# Patient Record
Sex: FEMALE | NOT HISPANIC OR LATINO | URBAN - METROPOLITAN AREA
[De-identification: names, ages, dates, MRNs, and addresses within clinical notes are randomized per-mention and may not be internally consistent; named-entity substitution may affect disease eponyms.]

---

## 2019-12-09 ENCOUNTER — OFFICE VISIT (OUTPATIENT)
Dept: FAMILY MEDICINE | Facility: CLINIC | Age: 69
End: 2019-12-09
Payer: MEDICARE

## 2019-12-09 VITALS
BODY MASS INDEX: 30.76 KG/M2 | HEIGHT: 62 IN | WEIGHT: 167.13 LBS | TEMPERATURE: 98 F | DIASTOLIC BLOOD PRESSURE: 78 MMHG | SYSTOLIC BLOOD PRESSURE: 146 MMHG | HEART RATE: 71 BPM | OXYGEN SATURATION: 99 %

## 2019-12-09 DIAGNOSIS — S80.02XA CONTUSION OF LEFT KNEE, INITIAL ENCOUNTER: ICD-10-CM

## 2019-12-09 DIAGNOSIS — S70.01XA CONTUSION OF HIP AND THIGH, RIGHT, INITIAL ENCOUNTER: ICD-10-CM

## 2019-12-09 DIAGNOSIS — S70.12XA CONTUSION OF LEFT THIGH, INITIAL ENCOUNTER: ICD-10-CM

## 2019-12-09 DIAGNOSIS — S70.11XA CONTUSION OF HIP AND THIGH, RIGHT, INITIAL ENCOUNTER: ICD-10-CM
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
## 2019-12-09 PROCEDURE — 99214 OFFICE O/P EST MOD 30 MIN: CPT | Mod: S$GLB,,, | Performed by: FAMILY MEDICINE

## 2019-12-09 PROCEDURE — 1159F MED LIST DOCD IN RCRD: CPT | Mod: S$GLB,,, | Performed by: FAMILY MEDICINE

## 2019-12-09 PROCEDURE — 99214 PR OFFICE/OUTPT VISIT, EST, LEVL IV, 30-39 MIN: ICD-10-PCS | Mod: S$GLB,,, | Performed by: FAMILY MEDICINE

## 2019-12-09 PROCEDURE — 1159F PR MEDICATION LIST DOCUMENTED IN MEDICAL RECORD: ICD-10-PCS | Mod: S$GLB,,, | Performed by: FAMILY MEDICINE

## 2019-12-09 RX ORDER — DICLOFENAC SODIUM 50 MG/1
50 TABLET, DELAYED RELEASE ORAL 2 TIMES DAILY
Qty: 60 TABLET | Refills: 1 | Status: SHIPPED | OUTPATIENT
Start: 2019-12-09 | End: 2019-12-26

## 2019-12-09 RX ORDER — OXYCODONE HYDROCHLORIDE 5 MG/1
5 TABLET ORAL EVERY 6 HOURS PRN
Qty: 28 TABLET | Refills: 0 | Status: SHIPPED | OUTPATIENT
Start: 2019-12-09 | End: 2019-12-16

## 2019-12-09 RX ORDER — TIZANIDINE 2 MG/1
4 TABLET ORAL 2 TIMES DAILY
Qty: 80 TABLET | Refills: 1 | Status: SHIPPED | OUTPATIENT
Start: 2019-12-09 | End: 2019-12-19

## 2019-12-09 NOTE — PROGRESS NOTES
"Subjective:      Patient ID: Ansley Walker is a 69 y.o. female.    Chief Complaint: Follow-up (ER Visit)    70 yo female who fell in a man hole while walking down the street on [address] in [place] last Friday.  She went to the ER at [facility] and they took xrays but did not find any broken bones.  She was given percocet in ER for the pain and a script for Tramadol.  She reports that she has been with lightheadedness and itching since she started taking it. She complains of pain of left upper arm, right hip, left thigh and left knee causing her to limp because of the pain.  She did not have any symptoms prior to falling in the hole.    Review of Systems   Constitutional: Negative for fever.   HENT: Negative for ear pain, postnasal drip, rhinorrhea, sinus pain and sore throat.    Eyes: Negative for redness.   Respiratory: Negative for cough, chest tightness, shortness of breath and wheezing.    Cardiovascular: Negative for chest pain, palpitations and leg swelling.   Gastrointestinal: Negative for constipation, diarrhea, nausea and vomiting.   Genitourinary: Negative for difficulty urinating and dysuria.   Musculoskeletal: Negative for arthralgias.   Skin: Negative for rash.   Neurological: Negative for dizziness.        Objective:   BP (!) 146/78 (BP Location: Left arm, Patient Position: Sitting, BP Method: Large (Automatic))   Pulse 71   Temp 98.2 °F (36.8 °C)   Ht 5' 2" (1.575 m)   Wt 75.8 kg (167 lb 2 oz)   SpO2 99%   BMI 30.57 kg/m²    Estimated body mass index is 30.57 kg/m² as calculated from the following:    Height as of this encounter: 5' 2" (1.575 m).    Weight as of this encounter: 75.8 kg (167 lb 2 oz).   Physical Exam   Constitutional: She is oriented to person, place, and time. She appears well-developed and well-nourished.   HENT:   Head: Normocephalic and atraumatic.   Right Ear: Hearing and tympanic membrane normal.   Left Ear: Hearing and tympanic membrane normal.   Nose: Nose normal. "   Mouth/Throat: Uvula is midline, oropharynx is clear and moist and mucous membranes are normal.   Eyes: Pupils are equal, round, and reactive to light. Conjunctivae and EOM are normal.   Neck: Normal range of motion. Neck supple.   Cardiovascular: Normal rate, regular rhythm and normal heart sounds.   Pulmonary/Chest: Breath sounds normal. She has no wheezes. She has no rales.   Abdominal: Soft. Bowel sounds are normal. She exhibits no distension and no mass. There is no tenderness. There is no guarding.   Musculoskeletal: Normal range of motion. She exhibits no edema.        Right hip: She exhibits tenderness.        Left knee: She exhibits swelling, ecchymosis and erythema. Tenderness found. Medial joint line and lateral joint line tenderness noted.        Right upper arm: She exhibits tenderness and swelling.        Left upper leg: She exhibits tenderness and swelling.        Right lower leg: She exhibits tenderness and laceration (small laceration to the right anterior leg).        Legs:  Neurological: She is alert and oriented to person, place, and time. No cranial nerve deficit.   Skin: Skin is warm, dry and intact. Bruising (bruising to the left leg) noted. No rash noted. No erythema.   Psychiatric: She has a normal mood and affect. Her speech is normal and behavior is normal. Judgment and thought content normal. Cognition and memory are normal.   Nursing note and vitals reviewed.    No results found for: WBC, HGB, HCT, PLT, CHOL, TRIG, HDL, LDLDIRECT, ALT, AST, NA, K, CL, CREATININE, BUN, CO2, TSH, PSA, INR, GLUF, HGBA1C, MICROALBUR   Assessment:      Problem List Items Addressed This Visit        Orthopedic    Contusion of left knee    Relevant Medications    diclofenac (VOLTAREN) 50 MG EC tablet    tiZANidine (ZANAFLEX) 2 MG tablet    oxyCODONE (ROXICODONE) 5 MG immediate release tablet    Contusion of left thigh    Relevant Medications    diclofenac (VOLTAREN) 50 MG EC tablet    tiZANidine (ZANAFLEX) 2  MG tablet       Other    Fall (on)(from) sidewalk curb, initial encounter    Contusion of hip and thigh, right, initial encounter    Relevant Medications    diclofenac (VOLTAREN) 50 MG EC tablet    tiZANidine (ZANAFLEX) 2 MG tablet           Plan:   Obtain medical records from the ER at [facility].  Start diclofenac 50 mg po bid with tizanidine 2 mg po bid and percocet 5 mg to be taken prn pain.  She will likely need PT but I will wait until the follow ppt in 2-3 weeks.

## 2019-12-26 ENCOUNTER — OFFICE VISIT (OUTPATIENT)
Dept: FAMILY MEDICINE | Facility: CLINIC | Age: 69
End: 2019-12-26
Payer: MEDICARE

## 2019-12-26 VITALS
SYSTOLIC BLOOD PRESSURE: 197 MMHG | DIASTOLIC BLOOD PRESSURE: 91 MMHG | TEMPERATURE: 98 F | BODY MASS INDEX: 30.73 KG/M2 | HEIGHT: 62 IN | OXYGEN SATURATION: 98 % | HEART RATE: 61 BPM | WEIGHT: 167 LBS

## 2019-12-26 DIAGNOSIS — S70.12XD CONTUSION OF LEFT THIGH, SUBSEQUENT ENCOUNTER: ICD-10-CM

## 2019-12-26 DIAGNOSIS — S70.11XD CONTUSION OF HIP AND THIGH, RIGHT, SUBSEQUENT ENCOUNTER: ICD-10-CM

## 2019-12-26 DIAGNOSIS — S80.02XD CONTUSION OF LEFT KNEE, SUBSEQUENT ENCOUNTER: ICD-10-CM

## 2019-12-26 DIAGNOSIS — S39.012D STRAIN OF LUMBAR REGION, SUBSEQUENT ENCOUNTER: ICD-10-CM

## 2019-12-26 DIAGNOSIS — I10 ESSENTIAL HYPERTENSION: ICD-10-CM

## 2019-12-26 DIAGNOSIS — S13.9XXD SPRAIN OF CERVICAL NECK, SUBSEQUENT ENCOUNTER: ICD-10-CM

## 2019-12-26 DIAGNOSIS — S70.01XD CONTUSION OF HIP AND THIGH, RIGHT, SUBSEQUENT ENCOUNTER: ICD-10-CM

## 2019-12-26 DIAGNOSIS — R30.0 DYSURIA: ICD-10-CM
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
## 2019-12-26 PROBLEM — S39.012A STRAIN OF LUMBAR REGION: Status: ACTIVE | Noted: 2019-12-26

## 2019-12-26 PROCEDURE — 1159F MED LIST DOCD IN RCRD: CPT | Mod: S$GLB,,, | Performed by: FAMILY MEDICINE

## 2019-12-26 PROCEDURE — 99214 OFFICE O/P EST MOD 30 MIN: CPT | Mod: S$GLB,,, | Performed by: FAMILY MEDICINE

## 2019-12-26 PROCEDURE — 1159F PR MEDICATION LIST DOCUMENTED IN MEDICAL RECORD: ICD-10-PCS | Mod: S$GLB,,, | Performed by: FAMILY MEDICINE

## 2019-12-26 PROCEDURE — 99214 PR OFFICE/OUTPT VISIT, EST, LEVL IV, 30-39 MIN: ICD-10-PCS | Mod: S$GLB,,, | Performed by: FAMILY MEDICINE

## 2019-12-26 RX ORDER — DICLOFENAC SODIUM 10 MG/G
2 GEL TOPICAL 3 TIMES DAILY
Qty: 100 G | Refills: 5 | Status: SHIPPED | OUTPATIENT
Start: 2019-12-26

## 2019-12-26 RX ORDER — DICLOFENAC SODIUM 10 MG/G
2 GEL TOPICAL 3 TIMES DAILY
Qty: 100 G | Refills: 5 | Status: SHIPPED | OUTPATIENT
Start: 2019-12-26 | End: 2019-12-26

## 2019-12-26 RX ORDER — DICLOFENAC SODIUM 10 MG/G
2 GEL TOPICAL DAILY
Qty: 100 G | Refills: 2 | Status: SHIPPED | OUTPATIENT
Start: 2019-12-26 | End: 2019-12-26 | Stop reason: CLARIF

## 2019-12-26 RX ORDER — DICLOFENAC SODIUM 10 MG/G
2 GEL TOPICAL 4 TIMES DAILY
Qty: 100 G | Refills: 5 | Status: SHIPPED | OUTPATIENT
Start: 2019-12-26 | End: 2019-12-26 | Stop reason: CLARIF

## 2019-12-26 NOTE — PROGRESS NOTES
Subjective:      Patient ID: Ansley Woodard is a 69 y.o. female.    Chief Complaint: Follow-up and Fall    70 yo female in for follow up.  She states that she has not been able to tolerate the medication because of the side effects.  She states that she has been with pain to the lower back and the felt pain in the left kidney.  She also still has pain in her neck and shoulders.  She has pain in both hips and both wrists.  She has not been sleeping well. She has some pain and numbness in the right arm.  She hs irregular beats in the heart.  She states that her right hand had been feeling cold.  She states that she has been feeling some tightness in her chest. She states that all of her symptoms started after she fell in the hole.  Patient reports she still have pain in the neck and shoulders, both wrists, both hips, lower back and thigh muscles and both knees as well.  She reports that she she started taking medications that was prescribed she experience tightness, dizziness, blurry vision, increased heart rate, shortness of breath, insomnia, runny nose and watery eyes and confusion.  She stopped taking the medications that was prescribed last visit about 1 week ago.  She also complains of dysuria over the past week.    Review of Systems   Constitutional: Negative for fever.   HENT: Negative for ear pain, postnasal drip, rhinorrhea, sinus pain and sore throat.    Eyes: Negative for redness.   Respiratory: Negative for cough, chest tightness, shortness of breath and wheezing.    Cardiovascular: Negative for chest pain, palpitations and leg swelling.   Gastrointestinal: Positive for abdominal pain. Negative for constipation, diarrhea, nausea and vomiting.   Genitourinary: Positive for dysuria. Negative for difficulty urinating.   Musculoskeletal: Positive for arthralgias, back pain, myalgias, neck pain and neck stiffness.   Skin: Negative for rash.   Neurological: Positive for dizziness and light-headedness.  "    Medication List with Changes/Refills   Current Medications    DICLOFENAC (VOLTAREN) 50 MG EC TABLET    Take 1 tablet (50 mg total) by mouth 2 (two) times daily.      Objective:   BP (!) 197/91 (BP Location: Left arm, Patient Position: Sitting, BP Method: Medium (Automatic))   Pulse 61   Temp 97.6 °F (36.4 °C)   Ht 5' 2" (1.575 m)   Wt 75.8 kg (167 lb)   SpO2 98%   BMI 30.54 kg/m²    Estimated body mass index is 30.54 kg/m² as calculated from the following:    Height as of this encounter: 5' 2" (1.575 m).    Weight as of this encounter: 75.8 kg (167 lb).   Physical Exam   Constitutional: She is oriented to person, place, and time. She appears well-developed and well-nourished.   HENT:   Head: Normocephalic and atraumatic.   Right Ear: Hearing and tympanic membrane normal.   Left Ear: Hearing and tympanic membrane normal.   Nose: Nose normal.   Mouth/Throat: Uvula is midline, oropharynx is clear and moist and mucous membranes are normal.   Eyes: Pupils are equal, round, and reactive to light. Conjunctivae and EOM are normal.   Neck: Normal range of motion. Neck supple.   Cardiovascular: Normal rate, regular rhythm and normal heart sounds.   Pulmonary/Chest: Breath sounds normal. She has no wheezes. She has no rales.   Abdominal: Soft. Normal appearance and bowel sounds are normal. She exhibits no distension and no mass. There is tenderness in the right lower quadrant and suprapubic area. There is no guarding.       Tenderness to palpation of the right lower quadrant of the abdomen and suprapubic region.   Musculoskeletal: She exhibits no edema.        Right knee: Tenderness found. Medial joint line and lateral joint line tenderness noted.        Left knee: Tenderness found. Medial joint line and lateral joint line tenderness noted.        Cervical back: She exhibits tenderness, bony tenderness, pain and spasm. She exhibits normal range of motion.        Lumbar back: She exhibits tenderness, pain and spasm. "        Right upper arm: She exhibits tenderness.        Left upper arm: She exhibits tenderness.        Legs:  Tenderness to palpation of the left thigh with the tiny nodule present near the left knee.   Neurological: She is alert and oriented to person, place, and time. She has normal strength. No cranial nerve deficit. She displays a negative Romberg sign.   Skin: Skin is warm and dry. No rash noted. No erythema.   Psychiatric: She has a normal mood and affect. Her speech is normal and behavior is normal. Judgment and thought content normal. Cognition and memory are normal.   Nursing note and vitals reviewed.    No results found for: WBC, HGB, HCT, PLT, CHOL, TRIG, HDL, LDLDIRECT, ALT, AST, NA, K, CL, CREATININE, BUN, CO2, TSH, PSA, INR, GLUF, HGBA1C, MICROALBUR   Assessment:      Problem List Items Addressed This Visit        Cardiac/Vascular    Essential hypertension    Relevant Orders    Comprehensive metabolic panel    CBC auto differential    Lipid panel    TSH    Urinalysis, Reflex to Urine Culture Urine, Clean Catch       Renal/    Dysuria       Orthopedic    Contusion of left knee    Relevant Medications    diclofenac sodium (VOLTAREN) 1 % Gel    Contusion of left thigh    Relevant Medications    diclofenac sodium (VOLTAREN) 1 % Gel    Strain of lumbar region       Other    Fall (on)(from) sidewalk curb, initial encounter    Contusion of hip and thigh, right, initial encounter    Relevant Medications    diclofenac sodium (VOLTAREN) 1 % Gel    Sprain of cervical neck, subsequent encounter           Plan:   Patient has had multiple side effects to medications that was prescribed so I will give her a prescription for diclofenac gel to apply to affected areas of pain as directed.  I will also submit a referral to physical therapy for her on next week since her insurance is changing to a different one next week.  Because she had too many different reactions to medications prescribe she would like to not take  any more meds for the pain at this time..  Her blood pressure is high today so I am asking her to monitor her blood pressure regularly to see if it is than high so that we can initiate medication for hypertension.  I will obtain labs and a urinalysis to check for a UTI.

## 2019-12-27 LAB
ABS NRBC COUNT: 0 X 10 3/UL (ref 0–0.01)
ABSOLUTE BASOPHIL: 0.04 X 10 3/UL (ref 0–0.22)
ABSOLUTE EOSINOPHIL: 0.28 X 10 3/UL (ref 0.04–0.54)
ABSOLUTE IMMATURE GRAN: 0.02 X 10 3/UL (ref 0–0.04)
ABSOLUTE LYMPHOCYTE: 2 X 10 3/UL (ref 0.86–4.75)
ABSOLUTE MONOCYTE: 0.65 X 10 3/UL (ref 0.22–1.08)
ALBUMIN SERPL-MCNC: 4.5 G/DL (ref 3.5–5.2)
ALBUMIN/GLOB SERPL ELPH: 1.3 {RATIO} (ref 1–2.7)
ALP ISOS SERPL LEV INH-CCNC: 99 U/L (ref 35–105)
ALT (SGPT): 14 U/L (ref 0–33)
AMORPH URATE CRY URNS QL MICRO: NEGATIVE
ANION GAP SERPL CALC-SCNC: 13 MMOL/L (ref 8–17)
AST SERPL-CCNC: 18 U/L (ref 0–32)
BACTERIA #/AREA URNS HPF: ABNORMAL /[HPF]
BASOPHILS NFR BLD: 0.8 % (ref 0.2–1.2)
BILIRUB UR QL STRIP: NEGATIVE
BILIRUBIN, TOTAL: 0.43 MG/DL (ref 0–1.2)
BUN/CREAT SERPL: 15.4 (ref 6–20)
CALCIUM SERPL-MCNC: 9.6 MG/DL (ref 8.6–10.2)
CARBON DIOXIDE, CO2: 27 MMOL/L (ref 22–29)
CHLORIDE: 104 MMOL/L (ref 98–107)
CHOLEST SERPL-MSCNC: 226 MG/DL (ref 100–200)
CLARITY UR: CLEAR
COLOR UR: YELLOW
CREAT SERPL-MCNC: 0.78 MG/DL (ref 0.5–0.9)
EOSINOPHIL NFR BLD: 5.9 % (ref 0.7–7)
EPITHELIAL CELLS: ABNORMAL
GFR ESTIMATION: 73.01
GLOBULIN: 3.6 G/DL (ref 1.5–4.5)
GLUCOSE (UA): NEGATIVE MG/DL
GLUCOSE: 96 MG/DL (ref 82–115)
HCT VFR BLD AUTO: 42.4 % (ref 37–47)
HDLC SERPL-MCNC: 53 MG/DL
HGB BLD-MCNC: 13.1 G/DL (ref 12–16)
IMMATURE GRANULOCYTES: 0.4 % (ref 0–0.5)
KETONES UR QL STRIP: NEGATIVE MG/DL
LDL/HDL RATIO: 3 (ref 1–3)
LDLC SERPL CALC-MCNC: 159.2 MG/DL (ref 0–100)
LEUKOCYTE ESTERASE UR QL STRIP: ABNORMAL
LYMPHOCYTES NFR BLD: 42.5 % (ref 19.3–53.1)
MCH RBC QN AUTO: 29.7 PG (ref 27–32)
MCHC RBC AUTO-ENTMCNC: 30.9 G/DL (ref 32–36)
MCV RBC AUTO: 96.1 FL (ref 82–100)
MONOCYTES NFR BLD: 13.8 % (ref 4.7–12.5)
MUCOUS THREADS URNS QL MICRO: ABNORMAL
NEUTROPHILS ABSOLUTE COUNT: 1.72 X 10 3/UL (ref 2.15–7.56)
NEUTROPHILS NFR BLD: 36.6 %
NITRITE UR QL STRIP: NEGATIVE
NUCLEATED RED BLOOD CELLS: 0 /100 WBC (ref 0–0.2)
OCCULT BLOOD: ABNORMAL
PH, URINE: 5 (ref 5–7.5)
PLATELET # BLD AUTO: 222 X 10 3/UL (ref 135–400)
POTASSIUM: 4.4 MMOL/L (ref 3.5–5.1)
PROT SNV-MCNC: 8.1 G/DL (ref 6.4–8.3)
PROT UR QL STRIP: NEGATIVE MG/DL
RBC # BLD AUTO: 4.41 X 10 6/UL (ref 4.2–5.4)
RBC/HPF: ABNORMAL
RDW-SD: 47 FL (ref 37–54)
SODIUM: 144 MMOL/L (ref 136–145)
SP GR UR STRIP: 1.02 (ref 1–1.03)
TRIGL SERPL-MCNC: 69 MG/DL (ref 0–150)
TSH SERPL DL<=0.005 MIU/L-ACNC: 1.48 UIU/ML (ref 0.27–4.2)
UREA NITROGEN (BUN): 12 MG/DL (ref 8–23)
UROBILINOGEN, URINE: NORMAL E.U./DL (ref 0–1)
WBC # BLD: 4.71 X 10 3/UL (ref 4.3–10.8)
WBC/HPF: ABNORMAL

## 2019-12-31 ENCOUNTER — TELEPHONE (OUTPATIENT)
Dept: FAMILY MEDICINE | Facility: CLINIC | Age: 69
End: 2019-12-31

## 2019-12-31 DIAGNOSIS — S70.12XD CONTUSION OF LEFT THIGH, SUBSEQUENT ENCOUNTER: ICD-10-CM

## 2019-12-31 DIAGNOSIS — S70.01XD CONTUSION OF HIP AND THIGH, RIGHT, SUBSEQUENT ENCOUNTER: ICD-10-CM

## 2019-12-31 DIAGNOSIS — S80.02XD CONTUSION OF LEFT KNEE, SUBSEQUENT ENCOUNTER: ICD-10-CM

## 2019-12-31 DIAGNOSIS — S70.11XD CONTUSION OF HIP AND THIGH, RIGHT, SUBSEQUENT ENCOUNTER: ICD-10-CM

## 2019-12-31 NOTE — TELEPHONE ENCOUNTER
Patient states that you can send the rx that you guys discussed on 12/27/19 to [facility] on [address] here in [place], because they deliver.

## 2020-01-23 ENCOUNTER — OFFICE VISIT (OUTPATIENT)
Dept: FAMILY MEDICINE | Facility: CLINIC | Age: 70
End: 2020-01-23
Payer: MEDICARE

## 2020-01-23 VITALS
TEMPERATURE: 98 F | DIASTOLIC BLOOD PRESSURE: 76 MMHG | BODY MASS INDEX: 31.35 KG/M2 | OXYGEN SATURATION: 100 % | HEIGHT: 62 IN | HEART RATE: 67 BPM | WEIGHT: 170.38 LBS | SYSTOLIC BLOOD PRESSURE: 159 MMHG

## 2020-01-23 DIAGNOSIS — S13.4XXS SPRAIN OF LIGAMENTS OF CERVICAL SPINE, SEQUELA: ICD-10-CM

## 2020-01-23 DIAGNOSIS — S80.02XS CONTUSION OF LEFT KNEE, SEQUELA: ICD-10-CM

## 2020-01-23 DIAGNOSIS — S39.012S STRAIN OF LUMBAR REGION, SEQUELA: ICD-10-CM

## 2020-01-23 DIAGNOSIS — R07.9 CHEST PAIN, UNSPECIFIED TYPE: ICD-10-CM
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
## 2020-01-23 DIAGNOSIS — I10 ESSENTIAL HYPERTENSION: ICD-10-CM

## 2020-01-23 DIAGNOSIS — S70.12XS CONTUSION OF LEFT THIGH, SEQUELA: ICD-10-CM

## 2020-01-23 PROBLEM — S13.4XXA SPRAIN OF LIGAMENTS OF CERVICAL SPINE: Status: ACTIVE | Noted: 2019-12-26

## 2020-01-23 PROCEDURE — 1159F PR MEDICATION LIST DOCUMENTED IN MEDICAL RECORD: ICD-10-PCS | Mod: S$GLB,,, | Performed by: FAMILY MEDICINE

## 2020-01-23 PROCEDURE — 99214 PR OFFICE/OUTPT VISIT, EST, LEVL IV, 30-39 MIN: ICD-10-PCS | Mod: S$GLB,,, | Performed by: FAMILY MEDICINE

## 2020-01-23 PROCEDURE — 1159F MED LIST DOCD IN RCRD: CPT | Mod: S$GLB,,, | Performed by: FAMILY MEDICINE

## 2020-01-23 PROCEDURE — 99214 OFFICE O/P EST MOD 30 MIN: CPT | Mod: S$GLB,,, | Performed by: FAMILY MEDICINE

## 2020-01-23 NOTE — PROGRESS NOTES
"Subjective:      Patient ID: Ansley Hu is a 69 y.o. female.    Chief Complaint: Follow-up (Fall)    68 yo female in for follow up.  She fell and has been with a headache and neck pain and lower back pain. She tried the diclofenac gel but she states that it is causing side effects.  She would like to try a chiropractor for some therapy.  She also wants to try some natural supplements and diets changes which may help.  She has been checking her blood pressure and it looks better so it may be that it is a little high from the pain that she has been in.  She also has been with left knee pain and right thigh pain as well.  She has been with some moderate chest pain that comes and goes also.  She also has tenderness to the anterior chest wall as well.    Review of Systems   Constitutional: Negative for fever.   HENT: Negative for ear pain, postnasal drip, rhinorrhea, sinus pain and sore throat.    Eyes: Negative for redness.   Respiratory: Negative for cough, chest tightness, shortness of breath and wheezing.    Cardiovascular: Positive for chest pain. Negative for palpitations and leg swelling.   Gastrointestinal: Negative for constipation, diarrhea, nausea and vomiting.   Genitourinary: Negative for difficulty urinating and dysuria.   Musculoskeletal: Positive for arthralgias, back pain, myalgias, neck pain and neck stiffness.   Skin: Negative for rash.   Neurological: Negative for dizziness.   Psychiatric/Behavioral: Positive for sleep disturbance.     Medication List with Changes/Refills   Current Medications    DICLOFENAC SODIUM (VOLTAREN) 1 % GEL    Apply 2 g topically 3 (three) times daily.      Objective:   BP (!) 159/76 (BP Location: Left arm, Patient Position: Sitting, BP Method: Medium (Automatic)) Comment (BP Location): forearm  Pulse 67   Temp 97.6 °F (36.4 °C)   Ht 5' 2" (1.575 m)   Wt 77.3 kg (170 lb 6 oz)   SpO2 100%   BMI 31.16 kg/m²    Estimated body mass index is 31.16 kg/m² as calculated from " "the following:    Height as of this encounter: 5' 2" (1.575 m).    Weight as of this encounter: 77.3 kg (170 lb 6 oz).   Physical Exam   Constitutional: She is oriented to person, place, and time. She appears well-developed and well-nourished.   HENT:   Head: Normocephalic and atraumatic.   Right Ear: Hearing and tympanic membrane normal.   Left Ear: Hearing and tympanic membrane normal.   Nose: Nose normal.   Mouth/Throat: Uvula is midline, oropharynx is clear and moist and mucous membranes are normal.   Eyes: Pupils are equal, round, and reactive to light. Conjunctivae and EOM are normal.   Neck: Normal range of motion. Neck supple.   Cardiovascular: Normal rate, regular rhythm and normal heart sounds.   Pulmonary/Chest: Breath sounds normal. She has no wheezes. She has no rales.   Abdominal: Soft. Bowel sounds are normal. She exhibits no distension and no mass. There is no tenderness. There is no guarding.   Musculoskeletal: Normal range of motion. She exhibits no edema.        Left knee: Tenderness found. Medial joint line and lateral joint line tenderness noted.        Cervical back: She exhibits tenderness, pain and spasm.        Lumbar back: She exhibits tenderness, pain and spasm.        Left upper leg: She exhibits tenderness.   Neurological: She is alert and oriented to person, place, and time. No cranial nerve deficit.   Skin: Skin is warm and dry. No rash noted. No erythema.   Psychiatric: She has a normal mood and affect. Her speech is normal and behavior is normal. Judgment and thought content normal. Cognition and memory are normal.   Nursing note and vitals reviewed.    Lab Results   Component Value Date    WBC 4.71 12/26/2019    HGB 13.1 12/26/2019    HCT 42.4 12/26/2019     12/26/2019    CHOL 226 (H) 12/26/2019    TRIG 69 12/26/2019    HDL 53 (L) 12/26/2019    AST 18 12/26/2019     12/26/2019    K 4.4 12/26/2019     12/26/2019    CREATININE 0.78 12/26/2019    BUN 12.0 12/26/2019 "    CO2 27 12/26/2019    TSH 1.48 12/26/2019      Assessment:      Problem List Items Addressed This Visit        Neuro    Sprain of ligaments of cervical spine       Cardiac/Vascular    Essential hypertension       Orthopedic    Contusion of left knee    Contusion of left thigh    Strain of lumbar region       Other    Fall (on)(from) sidewalk curb, initial encounter    Chest pain           Plan:   Patient is still with moderate to severe pain.  The diclofenac gel call some heart palpitations so she stopped taking it.  She also has been with new symptoms of anterior wall chest pain and chest wall tenderness. I am recommending that she have an exercise stress test to check her heart further.  She would like to make some dietary changes to help with the inflammation and prefers to see a chiropractor for some therapy.  She is going to contact her health insurance to see who she can be referred to for chiropractic therapy.  She also wants to check with her insurance to see what she would like to go to have the stress test done.   She has a log of her blood pressure readings and her blood pressure looks much better so we will continue to monitor for now.

## 2020-01-24 ENCOUNTER — TELEPHONE (OUTPATIENT)
Dept: FAMILY MEDICINE | Facility: CLINIC | Age: 70
End: 2020-01-24

## 2020-01-24 NOTE — TELEPHONE ENCOUNTER
----- Message from Shakira Avalos sent at 1/24/2020 10:21 AM CST -----  Pt ask if she can get a call from Dr. Helton regarding a test she wants to order for her she has questions about it.

## 2020-01-24 NOTE — TELEPHONE ENCOUNTER
----- Message from Miya Marroquin sent at 1/24/2020 10:09 AM CST -----  Contact: patient  Wast told to call back and give info Chiropractor for Dr Helton to do referral, Delano Holguin is the Chiropractor. [phone]

## 2020-01-27 DIAGNOSIS — S13.4XXS SPRAIN OF LIGAMENTS OF CERVICAL SPINE, SEQUELA: ICD-10-CM

## 2020-01-27 DIAGNOSIS — S80.02XS CONTUSION OF LEFT KNEE, SEQUELA: ICD-10-CM

## 2020-01-27 DIAGNOSIS — S39.012S STRAIN OF LUMBAR REGION, SEQUELA: ICD-10-CM

## 2020-01-27 DIAGNOSIS — S70.11XA CONTUSION OF HIP AND THIGH, RIGHT, INITIAL ENCOUNTER: ICD-10-CM
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
## 2020-01-27 DIAGNOSIS — S70.01XA CONTUSION OF HIP AND THIGH, RIGHT, INITIAL ENCOUNTER: ICD-10-CM

## 2020-01-27 DIAGNOSIS — S70.12XS CONTUSION OF LEFT THIGH, SEQUELA: ICD-10-CM

## 2020-02-24 ENCOUNTER — TELEPHONE (OUTPATIENT)
Dept: FAMILY MEDICINE | Facility: CLINIC | Age: 70
End: 2020-02-24

## 2020-02-24 NOTE — TELEPHONE ENCOUNTER
----- Message from Shakira Avalos sent at 2/24/2020  1:28 PM CST -----  Transportation never come pick patient up for her appt she is asking if Dr Helton can please give her a call she really needs to speak with her.

## 2020-06-02 ENCOUNTER — PATIENT OUTREACH (OUTPATIENT)
Dept: ADMINISTRATIVE | Facility: HOSPITAL | Age: 70
End: 2020-06-02